# Patient Record
Sex: MALE | Race: OTHER | Employment: FULL TIME | URBAN - METROPOLITAN AREA
[De-identification: names, ages, dates, MRNs, and addresses within clinical notes are randomized per-mention and may not be internally consistent; named-entity substitution may affect disease eponyms.]

---

## 2021-03-07 ASSESSMENT — PAIN SCALES - GENERAL: PAINLEVEL_OUTOF10: 5

## 2021-03-07 ASSESSMENT — PAIN DESCRIPTION - LOCATION: LOCATION: ABDOMEN

## 2021-03-08 ENCOUNTER — ANESTHESIA (OUTPATIENT)
Dept: OPERATING ROOM | Age: 36
End: 2021-03-08

## 2021-03-08 ENCOUNTER — APPOINTMENT (OUTPATIENT)
Dept: NUCLEAR MEDICINE | Age: 36
End: 2021-03-08

## 2021-03-08 ENCOUNTER — HOSPITAL ENCOUNTER (OUTPATIENT)
Age: 36
Setting detail: OBSERVATION
Discharge: HOME | End: 2021-03-09
Attending: INTERNAL MEDICINE | Admitting: INTERNAL MEDICINE

## 2021-03-08 ENCOUNTER — APPOINTMENT (OUTPATIENT)
Dept: ULTRASOUND IMAGING | Age: 36
End: 2021-03-08

## 2021-03-08 ENCOUNTER — APPOINTMENT (OUTPATIENT)
Dept: GENERAL RADIOLOGY | Age: 36
End: 2021-03-08

## 2021-03-08 ENCOUNTER — APPOINTMENT (OUTPATIENT)
Dept: CT IMAGING | Age: 36
End: 2021-03-08

## 2021-03-08 ENCOUNTER — ANESTHESIA EVENT (OUTPATIENT)
Dept: OPERATING ROOM | Age: 36
End: 2021-03-08

## 2021-03-08 VITALS
DIASTOLIC BLOOD PRESSURE: 59 MMHG | RESPIRATION RATE: 18 BRPM | OXYGEN SATURATION: 100 % | SYSTOLIC BLOOD PRESSURE: 126 MMHG | TEMPERATURE: 97.5 F

## 2021-03-08 DIAGNOSIS — K81.0 ACUTE CHOLECYSTITIS: Primary | ICD-10-CM

## 2021-03-08 LAB
A/G RATIO: 1.6 (ref 1.1–2.2)
ALBUMIN SERPL-MCNC: 4.6 G/DL (ref 3.4–5)
ALP BLD-CCNC: 92 U/L (ref 40–129)
ALT SERPL-CCNC: 155 U/L (ref 10–40)
ANION GAP SERPL CALCULATED.3IONS-SCNC: 10 MMOL/L (ref 3–16)
AST SERPL-CCNC: 190 U/L (ref 15–37)
BASOPHILS ABSOLUTE: 0 K/UL (ref 0–0.2)
BASOPHILS RELATIVE PERCENT: 0.2 %
BILIRUB SERPL-MCNC: 2.5 MG/DL (ref 0–1)
BUN BLDV-MCNC: 15 MG/DL (ref 7–20)
CALCIUM SERPL-MCNC: 9.8 MG/DL (ref 8.3–10.6)
CHLORIDE BLD-SCNC: 102 MMOL/L (ref 99–110)
CO2: 24 MMOL/L (ref 21–32)
CREAT SERPL-MCNC: 1 MG/DL (ref 0.9–1.3)
EOSINOPHILS ABSOLUTE: 0.1 K/UL (ref 0–0.6)
EOSINOPHILS RELATIVE PERCENT: 1 %
ESTIMATED AVERAGE GLUCOSE: 108.3 MG/DL
GFR AFRICAN AMERICAN: >60
GFR NON-AFRICAN AMERICAN: >60
GLOBULIN: 2.9 G/DL
GLUCOSE BLD-MCNC: 182 MG/DL (ref 70–99)
HBA1C MFR BLD: 5.4 %
HCT VFR BLD CALC: 43.8 % (ref 40.5–52.5)
HEMOGLOBIN: 14.7 G/DL (ref 13.5–17.5)
LIPASE: 50 U/L (ref 13–60)
LYMPHOCYTES ABSOLUTE: 0.8 K/UL (ref 1–5.1)
LYMPHOCYTES RELATIVE PERCENT: 13.6 %
MCH RBC QN AUTO: 29.1 PG (ref 26–34)
MCHC RBC AUTO-ENTMCNC: 33.5 G/DL (ref 31–36)
MCV RBC AUTO: 86.8 FL (ref 80–100)
MONOCYTES ABSOLUTE: 0.4 K/UL (ref 0–1.3)
MONOCYTES RELATIVE PERCENT: 6.1 %
NEUTROPHILS ABSOLUTE: 4.7 K/UL (ref 1.7–7.7)
NEUTROPHILS RELATIVE PERCENT: 79.1 %
PDW BLD-RTO: 12.6 % (ref 12.4–15.4)
PLATELET # BLD: 108 K/UL (ref 135–450)
PMV BLD AUTO: 9.6 FL (ref 5–10.5)
POTASSIUM REFLEX MAGNESIUM: 4 MMOL/L (ref 3.5–5.1)
RBC # BLD: 5.05 M/UL (ref 4.2–5.9)
SODIUM BLD-SCNC: 136 MMOL/L (ref 136–145)
TOTAL PROTEIN: 7.5 G/DL (ref 6.4–8.2)
WBC # BLD: 5.9 K/UL (ref 4–11)

## 2021-03-08 PROCEDURE — 96366 THER/PROPH/DIAG IV INF ADDON: CPT

## 2021-03-08 PROCEDURE — 74177 CT ABD & PELVIS W/CONTRAST: CPT

## 2021-03-08 PROCEDURE — 76705 ECHO EXAM OF ABDOMEN: CPT

## 2021-03-08 PROCEDURE — 80053 COMPREHEN METABOLIC PANEL: CPT

## 2021-03-08 PROCEDURE — C1894 INTRO/SHEATH, NON-LASER: HCPCS | Performed by: SURGERY

## 2021-03-08 PROCEDURE — A9537 TC99M MEBROFENIN: HCPCS | Performed by: NURSE PRACTITIONER

## 2021-03-08 PROCEDURE — 83036 HEMOGLOBIN GLYCOSYLATED A1C: CPT

## 2021-03-08 PROCEDURE — 6360000002 HC RX W HCPCS: Performed by: ANESTHESIOLOGY

## 2021-03-08 PROCEDURE — 6360000002 HC RX W HCPCS: Performed by: NURSE ANESTHETIST, CERTIFIED REGISTERED

## 2021-03-08 PROCEDURE — 47563 LAPARO CHOLECYSTECTOMY/GRAPH: CPT | Performed by: SURGERY

## 2021-03-08 PROCEDURE — 7100000000 HC PACU RECOVERY - FIRST 15 MIN: Performed by: SURGERY

## 2021-03-08 PROCEDURE — 88304 TISSUE EXAM BY PATHOLOGIST: CPT

## 2021-03-08 PROCEDURE — 7100000001 HC PACU RECOVERY - ADDTL 15 MIN: Performed by: SURGERY

## 2021-03-08 PROCEDURE — 94761 N-INVAS EAR/PLS OXIMETRY MLT: CPT

## 2021-03-08 PROCEDURE — 6370000000 HC RX 637 (ALT 250 FOR IP): Performed by: SURGERY

## 2021-03-08 PROCEDURE — 74300 X-RAY BILE DUCTS/PANCREAS: CPT

## 2021-03-08 PROCEDURE — 2500000003 HC RX 250 WO HCPCS: Performed by: SURGERY

## 2021-03-08 PROCEDURE — 85025 COMPLETE CBC W/AUTO DIFF WBC: CPT

## 2021-03-08 PROCEDURE — 99283 EMERGENCY DEPT VISIT LOW MDM: CPT

## 2021-03-08 PROCEDURE — 2500000003 HC RX 250 WO HCPCS: Performed by: NURSE ANESTHETIST, CERTIFIED REGISTERED

## 2021-03-08 PROCEDURE — 2720000010 HC SURG SUPPLY STERILE: Performed by: SURGERY

## 2021-03-08 PROCEDURE — 99218 PR INITIAL OBSERVATION CARE/DAY 30 MINUTES: CPT | Performed by: SURGERY

## 2021-03-08 PROCEDURE — 78226 HEPATOBILIARY SYSTEM IMAGING: CPT

## 2021-03-08 PROCEDURE — 2580000003 HC RX 258: Performed by: INTERNAL MEDICINE

## 2021-03-08 PROCEDURE — 96374 THER/PROPH/DIAG INJ IV PUSH: CPT

## 2021-03-08 PROCEDURE — 3600000014 HC SURGERY LEVEL 4 ADDTL 15MIN: Performed by: SURGERY

## 2021-03-08 PROCEDURE — APPSS60 APP SPLIT SHARED TIME 46-60 MINUTES: Performed by: NURSE PRACTITIONER

## 2021-03-08 PROCEDURE — APPNB30 APP NON BILLABLE TIME 0-30 MINS: Performed by: NURSE PRACTITIONER

## 2021-03-08 PROCEDURE — C9113 INJ PANTOPRAZOLE SODIUM, VIA: HCPCS | Performed by: INTERNAL MEDICINE

## 2021-03-08 PROCEDURE — 83690 ASSAY OF LIPASE: CPT

## 2021-03-08 PROCEDURE — 6360000002 HC RX W HCPCS: Performed by: INTERNAL MEDICINE

## 2021-03-08 PROCEDURE — 96365 THER/PROPH/DIAG IV INF INIT: CPT

## 2021-03-08 PROCEDURE — 2700000000 HC OXYGEN THERAPY PER DAY

## 2021-03-08 PROCEDURE — 6370000000 HC RX 637 (ALT 250 FOR IP): Performed by: NURSE ANESTHETIST, CERTIFIED REGISTERED

## 2021-03-08 PROCEDURE — 3700000001 HC ADD 15 MINUTES (ANESTHESIA): Performed by: SURGERY

## 2021-03-08 PROCEDURE — 3430000000 HC RX DIAGNOSTIC RADIOPHARMACEUTICAL: Performed by: NURSE PRACTITIONER

## 2021-03-08 PROCEDURE — 2580000003 HC RX 258: Performed by: SURGERY

## 2021-03-08 PROCEDURE — 6360000004 HC RX CONTRAST MEDICATION: Performed by: PHYSICIAN ASSISTANT

## 2021-03-08 PROCEDURE — 2500000003 HC RX 250 WO HCPCS: Performed by: PHYSICIAN ASSISTANT

## 2021-03-08 PROCEDURE — 6360000004 HC RX CONTRAST MEDICATION: Performed by: SURGERY

## 2021-03-08 PROCEDURE — 96375 TX/PRO/DX INJ NEW DRUG ADDON: CPT

## 2021-03-08 PROCEDURE — 3700000000 HC ANESTHESIA ATTENDED CARE: Performed by: SURGERY

## 2021-03-08 PROCEDURE — G0378 HOSPITAL OBSERVATION PER HR: HCPCS

## 2021-03-08 PROCEDURE — 96361 HYDRATE IV INFUSION ADD-ON: CPT

## 2021-03-08 PROCEDURE — 3600000004 HC SURGERY LEVEL 4 BASE: Performed by: SURGERY

## 2021-03-08 PROCEDURE — 2580000003 HC RX 258: Performed by: NURSE PRACTITIONER

## 2021-03-08 PROCEDURE — 2580000003 HC RX 258: Performed by: PHYSICIAN ASSISTANT

## 2021-03-08 PROCEDURE — 2709999900 HC NON-CHARGEABLE SUPPLY: Performed by: SURGERY

## 2021-03-08 RX ORDER — DEXAMETHASONE SODIUM PHOSPHATE 4 MG/ML
INJECTION, SOLUTION INTRA-ARTICULAR; INTRALESIONAL; INTRAMUSCULAR; INTRAVENOUS; SOFT TISSUE PRN
Status: DISCONTINUED | OUTPATIENT
Start: 2021-03-08 | End: 2021-03-08 | Stop reason: SDUPTHER

## 2021-03-08 RX ORDER — ONDANSETRON 2 MG/ML
4 INJECTION INTRAMUSCULAR; INTRAVENOUS
Status: DISCONTINUED | OUTPATIENT
Start: 2021-03-08 | End: 2021-03-08 | Stop reason: HOSPADM

## 2021-03-08 RX ORDER — SUCCINYLCHOLINE/SOD CL,ISO/PF 200MG/10ML
SYRINGE (ML) INTRAVENOUS PRN
Status: DISCONTINUED | OUTPATIENT
Start: 2021-03-08 | End: 2021-03-08 | Stop reason: SDUPTHER

## 2021-03-08 RX ORDER — HYDROMORPHONE HYDROCHLORIDE 1 MG/ML
0.5 INJECTION, SOLUTION INTRAMUSCULAR; INTRAVENOUS; SUBCUTANEOUS
Status: DISCONTINUED | OUTPATIENT
Start: 2021-03-08 | End: 2021-03-08

## 2021-03-08 RX ORDER — POLYETHYLENE GLYCOL 3350 17 G/17G
17 POWDER, FOR SOLUTION ORAL DAILY PRN
Status: DISCONTINUED | OUTPATIENT
Start: 2021-03-08 | End: 2021-03-09 | Stop reason: HOSPADM

## 2021-03-08 RX ORDER — OXYCODONE HYDROCHLORIDE AND ACETAMINOPHEN 5; 325 MG/1; MG/1
2 TABLET ORAL EVERY 4 HOURS PRN
Status: DISCONTINUED | OUTPATIENT
Start: 2021-03-08 | End: 2021-03-09 | Stop reason: HOSPADM

## 2021-03-08 RX ORDER — SODIUM CHLORIDE, SODIUM LACTATE, POTASSIUM CHLORIDE, CALCIUM CHLORIDE 600; 310; 30; 20 MG/100ML; MG/100ML; MG/100ML; MG/100ML
INJECTION, SOLUTION INTRAVENOUS CONTINUOUS PRN
Status: COMPLETED | OUTPATIENT
Start: 2021-03-08 | End: 2021-03-08

## 2021-03-08 RX ORDER — LABETALOL HYDROCHLORIDE 5 MG/ML
5 INJECTION, SOLUTION INTRAVENOUS EVERY 10 MIN PRN
Status: DISCONTINUED | OUTPATIENT
Start: 2021-03-08 | End: 2021-03-08 | Stop reason: HOSPADM

## 2021-03-08 RX ORDER — PROMETHAZINE HYDROCHLORIDE 25 MG/1
12.5 TABLET ORAL EVERY 6 HOURS PRN
Status: DISCONTINUED | OUTPATIENT
Start: 2021-03-08 | End: 2021-03-09 | Stop reason: HOSPADM

## 2021-03-08 RX ORDER — BUPIVACAINE HYDROCHLORIDE 5 MG/ML
INJECTION, SOLUTION EPIDURAL; INTRACAUDAL
Status: COMPLETED | OUTPATIENT
Start: 2021-03-08 | End: 2021-03-08

## 2021-03-08 RX ORDER — PHENYLEPHRINE HCL IN 0.9% NACL 1 MG/10 ML
SYRINGE (ML) INTRAVENOUS PRN
Status: DISCONTINUED | OUTPATIENT
Start: 2021-03-08 | End: 2021-03-08 | Stop reason: SDUPTHER

## 2021-03-08 RX ORDER — OXYCODONE HYDROCHLORIDE AND ACETAMINOPHEN 5; 325 MG/1; MG/1
1 TABLET ORAL EVERY 4 HOURS PRN
Status: DISCONTINUED | OUTPATIENT
Start: 2021-03-08 | End: 2021-03-09 | Stop reason: HOSPADM

## 2021-03-08 RX ORDER — EPHEDRINE SULFATE/0.9% NACL/PF 50 MG/5 ML
SYRINGE (ML) INTRAVENOUS PRN
Status: DISCONTINUED | OUTPATIENT
Start: 2021-03-08 | End: 2021-03-08 | Stop reason: SDUPTHER

## 2021-03-08 RX ORDER — HYDRALAZINE HYDROCHLORIDE 20 MG/ML
5 INJECTION INTRAMUSCULAR; INTRAVENOUS EVERY 10 MIN PRN
Status: DISCONTINUED | OUTPATIENT
Start: 2021-03-08 | End: 2021-03-08 | Stop reason: HOSPADM

## 2021-03-08 RX ORDER — 0.9 % SODIUM CHLORIDE 0.9 %
1000 INTRAVENOUS SOLUTION INTRAVENOUS ONCE
Status: COMPLETED | OUTPATIENT
Start: 2021-03-08 | End: 2021-03-08

## 2021-03-08 RX ORDER — FENTANYL CITRATE 50 UG/ML
INJECTION, SOLUTION INTRAMUSCULAR; INTRAVENOUS PRN
Status: DISCONTINUED | OUTPATIENT
Start: 2021-03-08 | End: 2021-03-08 | Stop reason: SDUPTHER

## 2021-03-08 RX ORDER — HYDRALAZINE HYDROCHLORIDE 20 MG/ML
10 INJECTION INTRAMUSCULAR; INTRAVENOUS EVERY 6 HOURS PRN
Status: DISCONTINUED | OUTPATIENT
Start: 2021-03-08 | End: 2021-03-09 | Stop reason: HOSPADM

## 2021-03-08 RX ORDER — HYDROMORPHONE HCL 110MG/55ML
0.5 PATIENT CONTROLLED ANALGESIA SYRINGE INTRAVENOUS EVERY 5 MIN PRN
Status: DISCONTINUED | OUTPATIENT
Start: 2021-03-08 | End: 2021-03-08 | Stop reason: HOSPADM

## 2021-03-08 RX ORDER — ONDANSETRON 2 MG/ML
4 INJECTION INTRAMUSCULAR; INTRAVENOUS EVERY 6 HOURS PRN
Status: DISCONTINUED | OUTPATIENT
Start: 2021-03-08 | End: 2021-03-09 | Stop reason: HOSPADM

## 2021-03-08 RX ORDER — SODIUM CHLORIDE 0.9 % (FLUSH) 0.9 %
10 SYRINGE (ML) INJECTION PRN
Status: DISCONTINUED | OUTPATIENT
Start: 2021-03-08 | End: 2021-03-09 | Stop reason: HOSPADM

## 2021-03-08 RX ORDER — KETAMINE HCL IN NACL, ISO-OSM 100MG/10ML
SYRINGE (ML) INJECTION PRN
Status: DISCONTINUED | OUTPATIENT
Start: 2021-03-08 | End: 2021-03-08 | Stop reason: SDUPTHER

## 2021-03-08 RX ORDER — GLYCOPYRROLATE 1 MG/5 ML
SYRINGE (ML) INTRAVENOUS PRN
Status: DISCONTINUED | OUTPATIENT
Start: 2021-03-08 | End: 2021-03-08 | Stop reason: SDUPTHER

## 2021-03-08 RX ORDER — SODIUM CHLORIDE 0.9 % (FLUSH) 0.9 %
10 SYRINGE (ML) INJECTION EVERY 12 HOURS SCHEDULED
Status: DISCONTINUED | OUTPATIENT
Start: 2021-03-08 | End: 2021-03-09 | Stop reason: HOSPADM

## 2021-03-08 RX ORDER — MIDAZOLAM HYDROCHLORIDE 1 MG/ML
INJECTION INTRAMUSCULAR; INTRAVENOUS PRN
Status: DISCONTINUED | OUTPATIENT
Start: 2021-03-08 | End: 2021-03-08 | Stop reason: SDUPTHER

## 2021-03-08 RX ORDER — PROPOFOL 10 MG/ML
INJECTION, EMULSION INTRAVENOUS PRN
Status: DISCONTINUED | OUTPATIENT
Start: 2021-03-08 | End: 2021-03-08 | Stop reason: SDUPTHER

## 2021-03-08 RX ORDER — MAGNESIUM SULFATE HEPTAHYDRATE 500 MG/ML
INJECTION, SOLUTION INTRAMUSCULAR; INTRAVENOUS PRN
Status: DISCONTINUED | OUTPATIENT
Start: 2021-03-08 | End: 2021-03-08 | Stop reason: SDUPTHER

## 2021-03-08 RX ORDER — ROCURONIUM BROMIDE 10 MG/ML
INJECTION, SOLUTION INTRAVENOUS PRN
Status: DISCONTINUED | OUTPATIENT
Start: 2021-03-08 | End: 2021-03-08 | Stop reason: SDUPTHER

## 2021-03-08 RX ORDER — ACETAMINOPHEN 650 MG/1
650 SUPPOSITORY RECTAL EVERY 6 HOURS PRN
Status: DISCONTINUED | OUTPATIENT
Start: 2021-03-08 | End: 2021-03-09 | Stop reason: HOSPADM

## 2021-03-08 RX ORDER — ACETAMINOPHEN 325 MG/1
650 TABLET ORAL EVERY 6 HOURS PRN
Status: DISCONTINUED | OUTPATIENT
Start: 2021-03-08 | End: 2021-03-09 | Stop reason: HOSPADM

## 2021-03-08 RX ORDER — PANTOPRAZOLE SODIUM 40 MG/10ML
40 INJECTION, POWDER, LYOPHILIZED, FOR SOLUTION INTRAVENOUS DAILY
Status: DISCONTINUED | OUTPATIENT
Start: 2021-03-08 | End: 2021-03-09 | Stop reason: HOSPADM

## 2021-03-08 RX ORDER — MAGNESIUM HYDROXIDE 1200 MG/15ML
LIQUID ORAL CONTINUOUS PRN
Status: COMPLETED | OUTPATIENT
Start: 2021-03-08 | End: 2021-03-08

## 2021-03-08 RX ORDER — SODIUM CHLORIDE 9 MG/ML
INJECTION, SOLUTION INTRAVENOUS CONTINUOUS
Status: ACTIVE | OUTPATIENT
Start: 2021-03-08 | End: 2021-03-09

## 2021-03-08 RX ORDER — OXYCODONE HYDROCHLORIDE AND ACETAMINOPHEN 5; 325 MG/1; MG/1
1 TABLET ORAL
Status: DISCONTINUED | OUTPATIENT
Start: 2021-03-08 | End: 2021-03-08 | Stop reason: HOSPADM

## 2021-03-08 RX ORDER — MEPERIDINE HYDROCHLORIDE 25 MG/ML
12.5 INJECTION INTRAMUSCULAR; INTRAVENOUS; SUBCUTANEOUS EVERY 5 MIN PRN
Status: DISCONTINUED | OUTPATIENT
Start: 2021-03-08 | End: 2021-03-08 | Stop reason: HOSPADM

## 2021-03-08 RX ORDER — LIDOCAINE HYDROCHLORIDE 40 MG/ML
SOLUTION TOPICAL PRN
Status: DISCONTINUED | OUTPATIENT
Start: 2021-03-08 | End: 2021-03-08 | Stop reason: SDUPTHER

## 2021-03-08 RX ORDER — HYDROMORPHONE HCL 110MG/55ML
PATIENT CONTROLLED ANALGESIA SYRINGE INTRAVENOUS PRN
Status: DISCONTINUED | OUTPATIENT
Start: 2021-03-08 | End: 2021-03-08 | Stop reason: SDUPTHER

## 2021-03-08 RX ORDER — LIDOCAINE HYDROCHLORIDE 20 MG/ML
INJECTION, SOLUTION EPIDURAL; INFILTRATION; INTRACAUDAL; PERINEURAL PRN
Status: DISCONTINUED | OUTPATIENT
Start: 2021-03-08 | End: 2021-03-08 | Stop reason: SDUPTHER

## 2021-03-08 RX ADMIN — SODIUM CHLORIDE: 9 INJECTION, SOLUTION INTRAVENOUS at 17:31

## 2021-03-08 RX ADMIN — LIDOCAINE HYDROCHLORIDE 4 ML: 40 SOLUTION TOPICAL at 16:00

## 2021-03-08 RX ADMIN — Medication 0.2 MG: at 16:17

## 2021-03-08 RX ADMIN — LIDOCAINE HYDROCHLORIDE 100 MG: 20 INJECTION, SOLUTION EPIDURAL; INFILTRATION; INTRACAUDAL; PERINEURAL at 15:59

## 2021-03-08 RX ADMIN — HYDROMORPHONE HYDROCHLORIDE 0.5 MG: 2 INJECTION, SOLUTION INTRAMUSCULAR; INTRAVENOUS; SUBCUTANEOUS at 17:55

## 2021-03-08 RX ADMIN — SODIUM CHLORIDE: 9 INJECTION, SOLUTION INTRAVENOUS at 04:42

## 2021-03-08 RX ADMIN — PANTOPRAZOLE SODIUM 40 MG: 40 INJECTION, POWDER, FOR SOLUTION INTRAVENOUS at 04:42

## 2021-03-08 RX ADMIN — FENTANYL CITRATE 100 MCG: 50 INJECTION INTRAMUSCULAR; INTRAVENOUS at 15:59

## 2021-03-08 RX ADMIN — Medication 6.36 MILLICURIE: at 11:11

## 2021-03-08 RX ADMIN — IOPAMIDOL 75 ML: 755 INJECTION, SOLUTION INTRAVENOUS at 02:38

## 2021-03-08 RX ADMIN — MIDAZOLAM 2 MG: 1 INJECTION INTRAMUSCULAR; INTRAVENOUS at 15:53

## 2021-03-08 RX ADMIN — FAMOTIDINE 20 MG: 10 INJECTION, SOLUTION INTRAVENOUS at 01:33

## 2021-03-08 RX ADMIN — HYDROMORPHONE HYDROCHLORIDE 0.4 MG: 2 INJECTION, SOLUTION INTRAMUSCULAR; INTRAVENOUS; SUBCUTANEOUS at 17:18

## 2021-03-08 RX ADMIN — Medication 30 MG: at 16:10

## 2021-03-08 RX ADMIN — HYDROMORPHONE HYDROCHLORIDE 0.4 MG: 2 INJECTION, SOLUTION INTRAMUSCULAR; INTRAVENOUS; SUBCUTANEOUS at 17:12

## 2021-03-08 RX ADMIN — Medication 10 MG: at 16:22

## 2021-03-08 RX ADMIN — HYDROMORPHONE HYDROCHLORIDE 0.4 MG: 2 INJECTION, SOLUTION INTRAMUSCULAR; INTRAVENOUS; SUBCUTANEOUS at 17:15

## 2021-03-08 RX ADMIN — OXYCODONE HYDROCHLORIDE AND ACETAMINOPHEN 2 TABLET: 5; 325 TABLET ORAL at 20:49

## 2021-03-08 RX ADMIN — ROCURONIUM BROMIDE 40 MG: 10 INJECTION, SOLUTION INTRAVENOUS at 16:06

## 2021-03-08 RX ADMIN — HYDROMORPHONE HYDROCHLORIDE 0.4 MG: 2 INJECTION, SOLUTION INTRAMUSCULAR; INTRAVENOUS; SUBCUTANEOUS at 17:09

## 2021-03-08 RX ADMIN — PROPOFOL 100 MG: 10 INJECTION, EMULSION INTRAVENOUS at 17:05

## 2021-03-08 RX ADMIN — Medication 200 MG: at 15:59

## 2021-03-08 RX ADMIN — Medication 100 MCG: at 16:28

## 2021-03-08 RX ADMIN — SUGAMMADEX 200 MG: 100 INJECTION, SOLUTION INTRAVENOUS at 17:13

## 2021-03-08 RX ADMIN — HYDROMORPHONE HYDROCHLORIDE 0.5 MG: 2 INJECTION, SOLUTION INTRAMUSCULAR; INTRAVENOUS; SUBCUTANEOUS at 18:11

## 2021-03-08 RX ADMIN — PIPERACILLIN AND TAZOBACTAM 3375 MG: 3; .375 INJECTION, POWDER, LYOPHILIZED, FOR SOLUTION INTRAVENOUS at 04:43

## 2021-03-08 RX ADMIN — HYDROMORPHONE HYDROCHLORIDE 0.4 MG: 2 INJECTION, SOLUTION INTRAMUSCULAR; INTRAVENOUS; SUBCUTANEOUS at 17:16

## 2021-03-08 RX ADMIN — PIPERACILLIN AND TAZOBACTAM 3375 MG: 3; .375 INJECTION, POWDER, LYOPHILIZED, FOR SOLUTION INTRAVENOUS at 13:21

## 2021-03-08 RX ADMIN — MAGNESIUM SULFATE HEPTAHYDRATE 1 G: 500 INJECTION, SOLUTION INTRAMUSCULAR; INTRAVENOUS at 16:14

## 2021-03-08 RX ADMIN — PROPOFOL 300 MG: 10 INJECTION, EMULSION INTRAVENOUS at 15:59

## 2021-03-08 RX ADMIN — Medication 10 ML: at 11:11

## 2021-03-08 RX ADMIN — DEXAMETHASONE SODIUM PHOSPHATE 8 MG: 4 INJECTION, SOLUTION INTRAMUSCULAR; INTRAVENOUS at 16:10

## 2021-03-08 RX ADMIN — SODIUM CHLORIDE: 9 INJECTION, SOLUTION INTRAVENOUS at 16:11

## 2021-03-08 RX ADMIN — SODIUM CHLORIDE 1000 ML: 9 INJECTION, SOLUTION INTRAVENOUS at 01:53

## 2021-03-08 RX ADMIN — DEXAMETHASONE SODIUM PHOSPHATE 4 MG: 4 INJECTION, SOLUTION INTRAMUSCULAR; INTRAVENOUS at 17:24

## 2021-03-08 RX ADMIN — ROCURONIUM BROMIDE 10 MG: 10 INJECTION, SOLUTION INTRAVENOUS at 16:40

## 2021-03-08 ASSESSMENT — PULMONARY FUNCTION TESTS
PIF_VALUE: 2
PIF_VALUE: 27
PIF_VALUE: 26
PIF_VALUE: 25
PIF_VALUE: 25
PIF_VALUE: 5
PIF_VALUE: 19
PIF_VALUE: 25
PIF_VALUE: 0
PIF_VALUE: 27
PIF_VALUE: 2
PIF_VALUE: 26
PIF_VALUE: 4
PIF_VALUE: 17
PIF_VALUE: 24
PIF_VALUE: 25
PIF_VALUE: 25
PIF_VALUE: 26
PIF_VALUE: 4
PIF_VALUE: 26
PIF_VALUE: 26
PIF_VALUE: 5
PIF_VALUE: 26
PIF_VALUE: 22
PIF_VALUE: 27
PIF_VALUE: 28
PIF_VALUE: 27
PIF_VALUE: 2
PIF_VALUE: 26
PIF_VALUE: 24
PIF_VALUE: 28
PIF_VALUE: 24
PIF_VALUE: 26
PIF_VALUE: 25
PIF_VALUE: 26
PIF_VALUE: 2
PIF_VALUE: 25
PIF_VALUE: 25
PIF_VALUE: 22
PIF_VALUE: 21
PIF_VALUE: 25
PIF_VALUE: 26
PIF_VALUE: 25
PIF_VALUE: 25
PIF_VALUE: 4
PIF_VALUE: 19
PIF_VALUE: 25
PIF_VALUE: 5
PIF_VALUE: 20
PIF_VALUE: 25
PIF_VALUE: 25
PIF_VALUE: 6
PIF_VALUE: 25
PIF_VALUE: 0
PIF_VALUE: 26
PIF_VALUE: 20

## 2021-03-08 ASSESSMENT — PAIN SCALES - GENERAL: PAINLEVEL_OUTOF10: 7

## 2021-03-08 ASSESSMENT — ENCOUNTER SYMPTOMS
CHEST TIGHTNESS: 0
APNEA: 0
EYE DISCHARGE: 0
COLOR CHANGE: 0
SHORTNESS OF BREATH: 0
VOMITING: 1
EYE ITCHING: 0
NAUSEA: 1
BACK PAIN: 0
ABDOMINAL PAIN: 1
ABDOMINAL DISTENTION: 0

## 2021-03-08 ASSESSMENT — PAIN DESCRIPTION - PAIN TYPE
TYPE: SURGICAL PAIN
TYPE: SURGICAL PAIN

## 2021-03-08 ASSESSMENT — PAIN DESCRIPTION - ORIENTATION
ORIENTATION: RIGHT;UPPER
ORIENTATION: RIGHT;UPPER

## 2021-03-08 ASSESSMENT — PAIN DESCRIPTION - LOCATION: LOCATION: ABDOMEN

## 2021-03-08 ASSESSMENT — LIFESTYLE VARIABLES: SMOKING_STATUS: 0

## 2021-03-08 NOTE — ANESTHESIA POSTPROCEDURE EVALUATION
Department of Anesthesiology  Postprocedure Note    Patient: Nathaniel Moss  MRN: [id number]  YOB: 1985  Date of evaluation: 3/8/2021  Time:  6:08 PM     Procedure Summary     Date: 03/08/21 Room / Location: [address]    Anesthesia Start: 9824 Anesthesia Stop: 2133    Procedure: LAPAROSCOPIC CHOLECYSTECTOMY WITH INTRAOPERATIVE CHOLANGIOGRAM (N/A ) Diagnosis: (CHOLECYSTITIS)    Surgeons: Tia Valdes MD Responsible Provider: Jagdish Oviedo MD    Anesthesia Type: general ASA Status: 2          Anesthesia Type: general    Curt Phase I: Curt Score: 8    Curt Phase II:      Last vitals: Reviewed and per EMR flowsheets.        Anesthesia Post Evaluation    Patient location during evaluation: PACU  Patient participation: complete - patient participated  Level of consciousness: awake and alert  Airway patency: patent  Nausea & Vomiting: no vomiting and no nausea  Complications: no  Cardiovascular status: hemodynamically stable  Respiratory status: acceptable  Hydration status: stable

## 2021-03-08 NOTE — PLAN OF CARE
[facility]        Assessment & Plan of Care    History of Present Illness: Mr. Sushila Ayon is a 28 y.o. male who presents with a 1-day history of intermittent, sharp, pressure-like epigastric abdominal pain that initially woke him from sleep around 03:00 yesterday morning, eased up but then returned again around 15:00 and was constant, radiating into the back, and rated at a 7 out of 10 prompting him to come to the ED for further evaluation. No alleviating factors noted--tired OTC antiacids without relief; worse with cold water PO intake. Associated nausea, vomiting, and constipation. Last BM yesterday. He denies fevers, chills, anorexia, diarrhea, hematochezia, melena, urinary symptoms, bloating, chest pain, cough, SOB, or exposure to COVID-19. He reports similar symptoms a couple of weeks ago for which he was seen at [facility] ED and sent home, told that symptoms were likely related to reflux. These episodes of pain seem to occur several hours after intake of fatty foods. Medical history of kidney stones and GERD--he intermittently takes OTC antiacids for symptoms, no prior EGD. Denies heavy NSAID use. No prior abdominal surgeries. No blood thinners. Former smoker, quit on 1/4/2021. At present denies any abdominal pain or nausea.     Physical Exam:  CONSTITUTIONAL:  alert, no apparent distress and morbidly obese  NEUROLOGIC:  Mental Status Exam:  Level of Alertness:   awake  Orientation:   person, place, time  EYES:  sclera clear  ENT:  normocepalic, without obvious abnormality  NECK:  supple, symmetrical, trachea midline  LUNGS:  clear to auscultation  CARDIOVASCULAR:  regular rate and rhythm and no murmur noted  ABDOMEN: soft, non-distended, non-tender, voluntary guarding absent, no masses palpated, normal bowel sounds,  no scars, and hernia absent  Extremities: no edema  SKIN:  no bruising or bleeding and normal skin color, texture,

## 2021-03-08 NOTE — PROGRESS NOTES
Shift assessment completed. VSS. Patient alert and oriented x 4. Denies any pain or discomfort at this time. The care plan and education have been reviewed and mutually agreed upon with the patient. Call light in reach. Will continue to monitor. 1331 Consent obtained for laparoscopic cholecystectomy with intraoperative cholangiogram.     1527 Patient taken down to surgery.

## 2021-03-08 NOTE — H&P
Joslyn Lewis     Chief complaint-right upper quadrant abdominal pain    HPI: 68-year-old male who presented to the emergency room with a 1 day history of intermittent, sharp epigastric abdominal pain which woke him from sleep at 3 AM yesterday morning. The pain waxed and waned throughout the day but worsened significantly prompting his evaluation in the emergency room overnight. The pain is rated as a 7 out of 10 severity. Nothing makes it better or worse. Associated symptoms include nausea, vomiting constipation. No history of fevers, chills or urinary symptoms. Past Medical History:   Diagnosis Date    GERD (gastroesophageal reflux disease)        History reviewed. No pertinent surgical history.     Social History     Socioeconomic History    Marital status: Single     Spouse name: Not on file    Number of children: Not on file    Years of education: Not on file    Highest education level: Not on file   Occupational History    Not on file   Social Needs    Financial resource strain: Not on file    Food insecurity     Worry: Not on file     Inability: Not on file    Transportation needs     Medical: Not on file     Non-medical: Not on file   Tobacco Use    Smoking status: Former Smoker    Smokeless tobacco: Never Used   Substance and Sexual Activity    Alcohol use: Not on file    Drug use: Not on file    Sexual activity: Not on file   Lifestyle    Physical activity     Days per week: Not on file     Minutes per session: Not on file    Stress: Not on file   Relationships    Social connections     Talks on phone: Not on file     Gets together: Not on file     Attends Latter-day service: Not on file     Active member of club or organization: Not on file     Attends meetings of clubs or organizations: Not on file     Relationship status: Not on file    Intimate partner violence     Fear of current or ex partner: Not on file     Emotionally abused: Not on file     Physically abused: Not on file Forced sexual activity: Not on file   Other Topics Concern    Not on file   Social History Narrative    Not on file       Allergies: No Known Allergies    Prior to Admission medications    Not on File       Active Problems:    Acute cholecystitis  Resolved Problems:    * No resolved hospital problems. *      Blood pressure 131/83, pulse 67, temperature 97.8 °F (36.6 °C), temperature source Oral, resp. rate 18, height 6' 1\" (1.854 m), weight (!) 350 lb (158.8 kg), SpO2 96 %. Review of Systems   Constitutional: Positive for activity change and appetite change. Negative for chills and fever. HENT: Negative for congestion and dental problem. Eyes: Negative for discharge and itching. Respiratory: Negative for apnea and chest tightness. Cardiovascular: Negative for chest pain and leg swelling. Gastrointestinal: Positive for abdominal pain, nausea and vomiting. Negative for abdominal distention. Endocrine: Negative for cold intolerance and heat intolerance. Genitourinary: Negative for difficulty urinating and dysuria. Musculoskeletal: Negative for arthralgias and back pain. Skin: Negative for color change and pallor. Allergic/Immunologic: Negative for environmental allergies and food allergies. Neurological: Negative for dizziness and facial asymmetry. Hematological: Negative for adenopathy. Does not bruise/bleed easily. Psychiatric/Behavioral: Negative for agitation and behavioral problems. Physical Exam  Constitutional:       Appearance: He is well-developed. HENT:      Head: Normocephalic and atraumatic. Right Ear: External ear normal.      Left Ear: External ear normal.   Eyes:      Conjunctiva/sclera: Conjunctivae normal.   Neck:      Musculoskeletal: Normal range of motion and neck supple. Cardiovascular:      Rate and Rhythm: Normal rate and regular rhythm. Pulmonary:      Effort: Pulmonary effort is normal.      Breath sounds: Normal breath sounds.    Abdominal: General: There is no distension. Palpations: Abdomen is soft. Tenderness: There is abdominal tenderness. Musculoskeletal: Normal range of motion. Skin:     General: Skin is warm and dry. Neurological:      Mental Status: He is alert and oriented to person, place, and time. Psychiatric:         Behavior: Behavior normal.         Assessment:  55-year-old male with a 1 day history of sharp right upper quadrant abdominal pain associated with nausea, vomiting and constipation. Physical examination reveals focal right upper quadrant abdominal tenderness. White blood count is normal.  He has abnormal liver function tests including an ALT of 155, AST of 190 and a total bilirubin of 2.5. CAT scan of the abdomen pelvis showed findings suggestive of acute cholecystitis. Right upper quadrant ultrasound showed gallbladder wall thickening without gallstones. A subsequent HIDA scan showed nonvisualization of the gallbladder. The patient's clinical course and imaging studies are consistent with acute acalculous cholecystitis. Plan:  IV antibiotics  Laparoscopic cholecystectomy with intraoperative cholangiogram  Patient explained the risks,benefits and possible complications including bleeding, bowel injury, cbd injury or hepatic artery injury.             Shad Espinosa MD  3/8/2021

## 2021-03-08 NOTE — PLAN OF CARE
Problem: Pain:  Goal: Pain level will decrease  Description: Pain level will decrease  3/8/2021 1118 by Delma Cr RN  Outcome: Ongoing  3/8/2021 0819 by Lex Shrestha RN  Outcome: Ongoing  Goal: Control of acute pain  Description: Control of acute pain  3/8/2021 1118 by Delma Cr RN  Outcome: Ongoing  3/8/2021 0819 by Lex Shrestha RN  Outcome: Ongoing

## 2021-03-08 NOTE — ED NOTES
Report given on tablet, RN verbalized understanding and denied any need for further information, patient to be transported to unit at this time, normal saline infusing at time of transport      Brandon Garza RN  03/08/21 8640

## 2021-03-08 NOTE — Clinical Note
Patient Class: Inpatient [101]   REQUIRED: Diagnosis: Acute cholecystitis [575. 0. ICD-9-CM]   Estimated Length of Stay: Estimated stay of more than 2 midnights   Telemetry Bed Required?: No

## 2021-03-08 NOTE — ED PROVIDER NOTES
[facility]        Pt Name: Irineo Ruff  MRN: [id number]  [place] 1985  Date of evaluation: 3/7/2021  Provider: Chung Gerber  PCP: No primary care provider on file. LOY. I have evaluated this patient. My supervising physician was available for consultation. CHIEF COMPLAINT       Chief Complaint   Patient presents with    Abdominal Pain     c/o mid upper abd pain, no abd surgery hx. pain comes and goes with rad up back. c/o emesis and not able to urinate. HISTORY OF PRESENT ILLNESS   (Location, Timing/Onset, Context/Setting, Quality, Duration, Modifying Factors, Severity, Associated Signs and Symptoms)  Note limiting factors. Irineo Ruff is a 28 y.o. male patient presents emergency department for evaluation of mid upper abdominal pain. Patient states he was seen at [facility] for similar symptoms and had normal laboratory evaluation at that time. He states that they sent him home with Pepcid. Patient states he has acute worsening discomfort of epigastric abdominal pain that occasionally radiates into his back. He states that radiates slightly into his chest.  Denies any chest pain or shortness of breath. Patient states he has associated episodes of vomiting with the abdominal pain. He states that vomiting helps his pain lessened slightly for a few moments. He states he has had 3 vomiting episodes today. He states his pain started around 3 PM.  He states he had pizza for lunch. He states that his pain has been triggered by pizza in the past.  He states he feels as though food makes it worse but has been able to eat things like fast food without difficulty. Patient states he did not eat dinner today due to the discomfort. Patient states that at this time his pain has suddenly completely resolved. He states he has 0 pain at this time.   Patient states this is exactly how it happened the last time he components:    Glucose 182 (*)     Total Bilirubin 2.5 (*)      (*)      (*)     All other components within normal limits    Narrative:     Performed at:  [facility]  [address] [facility],  [place], [address]   Phone [phone]   LIPASE    Narrative:     Performed at:  [facility]  [address] [facility],  [place], [address]   Phone [phone]   HEMOGLOBIN A1C       All other labs were within normal range or not returned as of this dictation. EKG: All EKG's are interpreted by the Emergency Department Physician in the absence of a cardiologist.  Please see their note for interpretation of EKG. RADIOLOGY:   Non-plain film images such as CT, Ultrasound and MRI are read by the radiologist. Plain radiographic images are visualized and preliminarily interpreted by the ED Provider with the below findings:        Interpretation per the Radiologist below, if available at the time of this note:    CT ABDOMEN PELVIS W IV CONTRAST Additional Contrast? None   Final Result   Findings compatible with acute cholecystitis. Ultrasound may be obtained to   evaluate for underlying cholelithiasis. Normal appendix. Splenomegaly. US ABDOMEN LIMITED Specify organ? GALLBLADDER, LIVER    (Results Pending)     No results found.         PROCEDURES   Unless otherwise noted below, none     Procedures    CRITICAL CARE TIME   N/A    CONSULTS:  IP CONSULT TO GENERAL SURGERY      EMERGENCY DEPARTMENT COURSE and DIFFERENTIAL DIAGNOSIS/MDM:   Vitals:    Vitals:    03/07/21 2339   BP: (!) 155/92   Pulse: 69   Resp: 16   Temp: 98.2 °F (36.8 °C)   TempSrc: Infrared   SpO2: 96%   Weight: (!) 350 lb (158.8 kg)   Height: 6' 1\" (1.854 m)       Patient was given the following medications:  Medications   piperacillin-tazobactam (ZOSYN) 3,375 mg in dextrose 5 % 50 mL IVPB extended infusion (mini-bag) (has no administration in time range)   HYDROmorphone HCl PF (DILAUDID) injection 0.5 mg (has no administration in time range)   0.9 % sodium chloride infusion (has no administration in time range)   pantoprazole (PROTONIX) injection 40 mg (has no administration in time range)   hydrALAZINE (APRESOLINE) injection 10 mg (has no administration in time range)   0.9 % sodium chloride bolus (0 mLs Intravenous Stopped 3/8/21 0314)   famotidine (PEPCID) injection 20 mg (20 mg Intravenous Given 3/8/21 0133)   iopamidol (ISOVUE-370) 76 % injection 75 mL (75 mLs Intravenous Given 3/8/21 0238)         Patient presents emergency department for evaluation of abdominal pain. Patient states he has epigastric abdominal pain that lasts for a few hours and then will suddenly go away on its own. Patient states he had pain today for 9 hours and it just went away right before I came to evaluate the patient. Abdomen is soft and nontender without rebound or guarding. Patient abdomen is obese. He has a BMI of 46. Patient is given Pepcid but declines GI cocktail as his symptoms have largely resolved. Patient is afebrile. Vitals stable. Patient's bilirubin is 2.5, ALT is 155 and AST is 190. Patient is given a liter of fluids. Glucose is 182. Patient declines need for pain medication at this time. CT of the patient's abdomen shows acute cholecystitis. Ultrasound recommended to correlate for evidence of stones. I spoke with Dr. Charan Gutierrez who states that he will see the patient. Patient will be admitted to the hospitalist for further management. Dr. Maria Ines Serrano accepts the patient for admission. FINAL IMPRESSION      1. Acute cholecystitis          DISPOSITION/PLAN   DISPOSITION Admitted 03/08/2021 04:36:54 AM      PATIENT REFERREDTO:  No follow-up provider specified.     DISCHARGE MEDICATIONS:  New Prescriptions    No medications on file       DISCONTINUED MEDICATIONS:  Discontinued Medications    No medications on file              (Please note that portions of this note were completed with a voice recognition program.  Efforts were made to edit the dictations but occasionally words are mis-transcribed.)    Raquel Jones PA-C (electronically signed)            Raquel Jones PA-C  03/08/21 4423

## 2021-03-08 NOTE — LETTER
[facility]  [address]  Phone: [phone]    No name on file. March 9, 2021     Patient: Chris Contreras   YOB: 1985   Date of Visit: 3/7/2021       To Whom It May Concern:    Please excuse Mr Placido Penny from work from 3/8/2021 thru 3/15/21. He  may return to work on 3/15/2021. If you have any questions or concerns, please don't hesitate to call.     Sincerely,      Jackie Bojorquez PA-C

## 2021-03-08 NOTE — ANESTHESIA PRE PROCEDURE
Department of Anesthesiology  Preprocedure Note       Name:  Ortega Roque   Age:  28 y.o.  :  1985                                          MRN:  [id number]         Date:  3/8/2021      Surgeon: Oscar Collier):  Stuart Carney MD    Procedure: Procedure(s):  LAPAROSCOPIC CHOLECYSTECTOMY WITH INTRAOPERATIVE CHOLANGIOGRAM    Medications prior to admission:   Prior to Admission medications    Not on File       Current medications:    Current Facility-Administered Medications   Medication Dose Route Frequency Provider Last Rate Last Admin    piperacillin-tazobactam (ZOSYN) 3,375 mg in dextrose 5 % 50 mL IVPB extended infusion (mini-bag)  3,375 mg Intravenous Q8H Oxana Ellis MD 12.5 mL/hr at 21 1321 3,375 mg at 21 1321    HYDROmorphone HCl PF (DILAUDID) injection 0.5 mg  0.5 mg Intravenous Q3H PRN Oxana Ellis MD        0.9 % sodium chloride infusion   Intravenous Continuous Oxana Ellis  mL/hr at 21 0442 New Bag at 21 0442    pantoprazole (PROTONIX) injection 40 mg  40 mg Intravenous Daily Oxana Ellis MD   40 mg at 21 0442    sodium chloride flush 0.9 % injection 10 mL  10 mL Intravenous 2 times per day Oxana Ellis MD        sodium chloride flush 0.9 % injection 10 mL  10 mL Intravenous PRN Oxana Ellis MD        enoxaparin (LOVENOX) injection 40 mg  40 mg Subcutaneous Nightly Oxana Ellis MD        promethazine (PHENERGAN) tablet 12.5 mg  12.5 mg Oral Q6H PRN Oxana Ellis MD        Or    ondansetron (ZOFRAN) injection 4 mg  4 mg Intravenous Q6H PRN Oxana Ellis MD        polyethylene glycol (GLYCOLAX) packet 17 g  17 g Oral Daily PRN Oxana Ellis MD        acetaminophen (TYLENOL) tablet 650 mg  650 mg Oral Q6H PRN Oxana Ellis MD        Or    acetaminophen (TYLENOL) suppository 650 mg  650 mg Rectal Q6H PRN Oxana Ellis MD        hydrALAZINE (APRESOLINE) injection 10 mg  10 mg Intravenous Q6H PRN Madina Rios MD        sodium chloride flush 0.9 % injection 10 mL  10 mL Intravenous PRN Cindi Vaca APRGLENNY - CNP   10 mL at 03/08/21 1111    meperidine (DEMEROL) injection 12.5 mg  12.5 mg Intravenous Q5 Min PRN Lestine Leyden, MD        HYDROmorphone (DILAUDID) injection 0.5 mg  0.5 mg Intravenous Q5 Min PRN Lestine Leyden, MD        oxyCODONE-acetaminophen (PERCOCET) 5-325 MG per tablet 1 tablet  1 tablet Oral Once PRN Lestine Leyden, MD        ondansetron [facility]) injection 4 mg  4 mg Intravenous Once PRN Lestine Leyden, MD        labetalol (NORMODYNE;TRANDATE) injection 5 mg  5 mg Intravenous Q10 Min PRN Lestine Leyden, MD        hydrALAZINE (APRESOLINE) injection 5 mg  5 mg Intravenous Q10 Min PRN Lestine Leyden, MD           Allergies:  No Known Allergies    Problem List:    Patient Active Problem List   Diagnosis Code    Acute cholecystitis K81.0       Past Medical History:        Diagnosis Date    GERD (gastroesophageal reflux disease)        Past Surgical History:  History reviewed. No pertinent surgical history.     Social History:    Social History     Tobacco Use    Smoking status: Former Smoker    Smokeless tobacco: Never Used   Substance Use Topics    Alcohol use: Not on file                                Counseling given: Not Answered      Vital Signs (Current):   Vitals:    03/07/21 2339 03/08/21 0545 03/08/21 1045 03/08/21 1515   BP: (!) 155/92 107/70 114/67 131/83   Pulse: 69 61 68 67   Resp: 16 17 18    Temp: 98.2 °F (36.8 °C) 97.6 °F (36.4 °C) 97.7 °F (36.5 °C) 97.8 °F (36.6 °C)   TempSrc: Infrared Oral Oral Oral   SpO2: 96% 97% 96% 96%   Weight: (!) 350 lb (158.8 kg) (!) 350 lb (158.8 kg)     Height: 6' 1\" (1.854 m) 6' 1\" (1.854 m)                                                BP Readings from Last 3 Encounters:   03/08/21 131/83       NPO Status: Time of last liquid consumption: 2200                        Time of last solid consumption: 0900                        Date of last liquid consumption: 03/07/21                        Date of last solid food consumption: 03/07/21    BMI:   Wt Readings from Last 3 Encounters:   03/08/21 (!) 350 lb (158.8 kg)     Body mass index is 46.18 kg/m². CBC:   Lab Results   Component Value Date    WBC 5.9 03/08/2021    RBC 5.05 03/08/2021    HGB 14.7 03/08/2021    HCT 43.8 03/08/2021    MCV 86.8 03/08/2021    RDW 12.6 03/08/2021     03/08/2021       CMP:   Lab Results   Component Value Date     03/08/2021    K 4.0 03/08/2021     03/08/2021    CO2 24 03/08/2021    BUN 15 03/08/2021    CREATININE 1.0 03/08/2021    GFRAA >60 03/08/2021    AGRATIO 1.6 03/08/2021    LABGLOM >60 03/08/2021    GLUCOSE 182 03/08/2021    PROT 7.5 03/08/2021    CALCIUM 9.8 03/08/2021    BILITOT 2.5 03/08/2021    ALKPHOS 92 03/08/2021     03/08/2021     03/08/2021       POC Tests: No results for input(s): POCGLU, POCNA, POCK, POCCL, POCBUN, POCHEMO, POCHCT in the last 72 hours.     Coags: No results found for: PROTIME, INR, APTT    HCG (If Applicable): No results found for: PREGTESTUR, PREGSERUM, HCG, HCGQUANT     ABGs: No results found for: PHART, PO2ART, DJO2AME, FAW9ZHH, BEART, [id number]     Type & Screen (If Applicable):  No results found for: LABABO, LABRH    Drug/Infectious Status (If Applicable):  No results found for: HIV, HEPCAB    COVID-19 Screening (If Applicable): No results found for: COVID19      Anesthesia Evaluation  Patient summary reviewed and Nursing notes reviewed no history of anesthetic complications:   Airway: Mallampati: II  TM distance: >3 FB   Neck ROM: full  Mouth opening: > = 3 FB Dental: normal exam         Pulmonary:Negative Pulmonary ROS and normal exam  breath sounds clear to auscultation      (-) COPD, asthma, sleep apnea and not a current smoker                           Cardiovascular:        (-) hypertension, past MI, CAD, CABG/stent, dysrhythmias,  angina and  CHF Rhythm: regular  Rate: normal                    Neuro/Psych:   Negative Neuro/Psych ROS     (-) seizures, TIA and CVA           GI/Hepatic/Renal:   (+) GERD (fair control, no symptoms currently):,      (-) liver disease and no renal disease       Endo/Other: Negative Endo/Other ROS       (-) diabetes mellitus, hypothyroidism, hyperthyroidism               Abdominal:   (+) obese,         Vascular:                                        Anesthesia Plan      general     ASA 2       Induction: intravenous. MIPS: Postoperative opioids intended and Prophylactic antiemetics administered. Anesthetic plan and risks discussed with patient. Plan discussed with CRNA.                   Prakash Maya MD   3/8/2021

## 2021-03-09 VITALS
RESPIRATION RATE: 16 BRPM | SYSTOLIC BLOOD PRESSURE: 126 MMHG | HEART RATE: 62 BPM | HEIGHT: 73 IN | TEMPERATURE: 97.8 F | WEIGHT: 315 LBS | BODY MASS INDEX: 41.75 KG/M2 | OXYGEN SATURATION: 96 % | DIASTOLIC BLOOD PRESSURE: 67 MMHG

## 2021-03-09 LAB
A/G RATIO: 1.3 (ref 1.1–2.2)
ALBUMIN SERPL-MCNC: 4.4 G/DL (ref 3.4–5)
ALP BLD-CCNC: 116 U/L (ref 40–129)
ALT SERPL-CCNC: 372 U/L (ref 10–40)
ANION GAP SERPL CALCULATED.3IONS-SCNC: 10 MMOL/L (ref 3–16)
AST SERPL-CCNC: 314 U/L (ref 15–37)
BASOPHILS ABSOLUTE: 0 K/UL (ref 0–0.2)
BASOPHILS RELATIVE PERCENT: 0.1 %
BILIRUB SERPL-MCNC: 3.4 MG/DL (ref 0–1)
BUN BLDV-MCNC: 12 MG/DL (ref 7–20)
CALCIUM SERPL-MCNC: 10 MG/DL (ref 8.3–10.6)
CHLORIDE BLD-SCNC: 103 MMOL/L (ref 99–110)
CO2: 24 MMOL/L (ref 21–32)
CREAT SERPL-MCNC: 1.1 MG/DL (ref 0.9–1.3)
EOSINOPHILS ABSOLUTE: 0 K/UL (ref 0–0.6)
EOSINOPHILS RELATIVE PERCENT: 0 %
GFR AFRICAN AMERICAN: >60
GFR NON-AFRICAN AMERICAN: >60
GLOBULIN: 3.4 G/DL
GLUCOSE BLD-MCNC: 135 MG/DL (ref 70–99)
HCT VFR BLD CALC: 44.2 % (ref 40.5–52.5)
HEMOGLOBIN: 14.7 G/DL (ref 13.5–17.5)
LYMPHOCYTES ABSOLUTE: 0.8 K/UL (ref 1–5.1)
LYMPHOCYTES RELATIVE PERCENT: 12.9 %
MCH RBC QN AUTO: 29.3 PG (ref 26–34)
MCHC RBC AUTO-ENTMCNC: 33.2 G/DL (ref 31–36)
MCV RBC AUTO: 88.1 FL (ref 80–100)
MONOCYTES ABSOLUTE: 0.4 K/UL (ref 0–1.3)
MONOCYTES RELATIVE PERCENT: 6.3 %
NEUTROPHILS ABSOLUTE: 4.9 K/UL (ref 1.7–7.7)
NEUTROPHILS RELATIVE PERCENT: 80.7 %
PDW BLD-RTO: 13.2 % (ref 12.4–15.4)
PLATELET # BLD: 116 K/UL (ref 135–450)
PMV BLD AUTO: 9.7 FL (ref 5–10.5)
POTASSIUM REFLEX MAGNESIUM: 4.4 MMOL/L (ref 3.5–5.1)
RBC # BLD: 5.01 M/UL (ref 4.2–5.9)
SODIUM BLD-SCNC: 137 MMOL/L (ref 136–145)
TOTAL PROTEIN: 7.8 G/DL (ref 6.4–8.2)
WBC # BLD: 6.1 K/UL (ref 4–11)

## 2021-03-09 PROCEDURE — 6370000000 HC RX 637 (ALT 250 FOR IP): Performed by: SURGERY

## 2021-03-09 PROCEDURE — 6360000002 HC RX W HCPCS: Performed by: SURGERY

## 2021-03-09 PROCEDURE — APPNB30 APP NON BILLABLE TIME 0-30 MINS: Performed by: NURSE PRACTITIONER

## 2021-03-09 PROCEDURE — APPSS15 APP SPLIT SHARED TIME 0-15 MINUTES: Performed by: NURSE PRACTITIONER

## 2021-03-09 PROCEDURE — 99024 POSTOP FOLLOW-UP VISIT: CPT | Performed by: SURGERY

## 2021-03-09 PROCEDURE — 85025 COMPLETE CBC W/AUTO DIFF WBC: CPT

## 2021-03-09 PROCEDURE — 80053 COMPREHEN METABOLIC PANEL: CPT

## 2021-03-09 PROCEDURE — G0378 HOSPITAL OBSERVATION PER HR: HCPCS

## 2021-03-09 PROCEDURE — 2580000003 HC RX 258: Performed by: SURGERY

## 2021-03-09 PROCEDURE — C9113 INJ PANTOPRAZOLE SODIUM, VIA: HCPCS | Performed by: SURGERY

## 2021-03-09 RX ORDER — OXYCODONE HYDROCHLORIDE AND ACETAMINOPHEN 5; 325 MG/1; MG/1
1 TABLET ORAL EVERY 6 HOURS PRN
Qty: 15 TABLET | Refills: 0 | Status: SHIPPED | OUTPATIENT
Start: 2021-03-09 | End: 2021-03-14

## 2021-03-09 RX ADMIN — OXYCODONE HYDROCHLORIDE AND ACETAMINOPHEN 2 TABLET: 5; 325 TABLET ORAL at 09:06

## 2021-03-09 RX ADMIN — Medication 10 ML: at 09:01

## 2021-03-09 RX ADMIN — OXYCODONE HYDROCHLORIDE AND ACETAMINOPHEN 1 TABLET: 5; 325 TABLET ORAL at 00:47

## 2021-03-09 RX ADMIN — PANTOPRAZOLE SODIUM 40 MG: 40 INJECTION, POWDER, FOR SOLUTION INTRAVENOUS at 09:01

## 2021-03-09 RX ADMIN — OXYCODONE HYDROCHLORIDE AND ACETAMINOPHEN 2 TABLET: 5; 325 TABLET ORAL at 05:08

## 2021-03-09 RX ADMIN — OXYCODONE HYDROCHLORIDE AND ACETAMINOPHEN 2 TABLET: 5; 325 TABLET ORAL at 12:52

## 2021-03-09 RX ADMIN — SODIUM CHLORIDE: 9 INJECTION, SOLUTION INTRAVENOUS at 00:50

## 2021-03-09 ASSESSMENT — PAIN DESCRIPTION - LOCATION
LOCATION: ABDOMEN
LOCATION: ABDOMEN
LOCATION: ABDOMEN;SHOULDER
LOCATION: ABDOMEN
LOCATION: ABDOMEN

## 2021-03-09 ASSESSMENT — PAIN SCALES - GENERAL
PAINLEVEL_OUTOF10: 6
PAINLEVEL_OUTOF10: 8
PAINLEVEL_OUTOF10: 6
PAINLEVEL_OUTOF10: 3

## 2021-03-09 ASSESSMENT — PAIN DESCRIPTION - PAIN TYPE
TYPE: SURGICAL PAIN
TYPE: SURGICAL PAIN

## 2021-03-09 ASSESSMENT — PAIN DESCRIPTION - ORIENTATION
ORIENTATION: RIGHT;UPPER
ORIENTATION: RIGHT;UPPER

## 2021-03-09 NOTE — DISCHARGE SUMMARY
[facility]    Patient Demographics    Patient. Rima Jha  Date of Birth. 1985  MRN. [id number]     Primary care provider. No primary care provider on file. (Tel: None)    Admit date: 3/8/2021    Discharge date (blank if same as Note Date): Note Date: 3/9/2021     Reason for Hospitalization. Chief Complaint   Patient presents with    Abdominal Pain     c/o mid upper abd pain, no abd surgery hx. pain comes and goes with rad up back. c/o emesis and not able to urinate. Significant Findings. Active Problems:    Acute cholecystitis       Problems and results from this hospitalization that need follow up. 1. Postop follow-up  2. Abnormal LFTs-follow up in one week    Significant test results and incidental findings. 1. CT abd/pelvis  Findings compatible with acute cholecystitis.  Ultrasound may be obtained to   evaluate for underlying cholelithiasis.       Normal appendix.       Splenomegaly. Invasive procedures and treatments. 1. None     Problem-based Hospital Course. Patient is 68-year-old male who presented to hospital with 1 day history of severe epigastric abdominal pain associate with nausea and vomiting. Emergency room CT scan the abdomen pelvis revealed acute cholecystitis. His liver enzymes were elevated however his alk phos was normal.  Patient was admitted and seen by surgery. He was taken the OR and underwent a laparoscopic cholecystectomy by Dr. Veronica Stewart on March 8. He tolerated procedure well. Intraoperative cholangiogram was normal.  The following day his liver enzymes are higher however his pain is significantly improved. He is not nauseous. He is tolerating diet. He was discharged home in stable condition. He will of a complete metabolic panel in 1 week and follow-up with Dr. Veronica Stewart. Consults.   IP CONSULT TO GENERAL SURGERY    Physical examination on discharge day. /67   Pulse 62   Temp 97.8 °F (36.6 °C) (Oral)   Resp 16   Ht 6' 1\" (1.854 m)   Wt (!) 350 lb (158.8 kg)   SpO2 96%   BMI 46.18 kg/m²   General appearance. Alert. Looks comfortable. HEENT. Sclera clear. Moist mucus membranes. Cardiovascular. Regular rate and rhythm, normal S1, S2. No murmur. Respiratory. Not using accessory muscles. Clear to auscultation bilaterally, no wheeze. Gastrointestinal. Abdomen soft, non-tender, not distended, normal bowel sounds  Neurology. Facial symmetry. No speech deficits. Moving all extremities equally. Extremities. No edema in lower extremities. Skin. Warm, dry, normal turgor    Condition at time of discharge stable    Medication instructions provided to patient at discharge. Medication List      START taking these medications    oxyCODONE-acetaminophen 5-325 MG per tablet  Commonly known as: Percocet  Take 1 tablet by mouth every 6 hours as needed for Pain for up to 5 days. Take lowest dose possible to manage pain; may stop taking sooner than 7 days if pain is able to be controlled with non-narcotic analgesics and therapies. Where to Get Your Medications      You can get these medications from any pharmacy    Bring a paper prescription for each of these medications  · oxyCODONE-acetaminophen 5-325 MG per tablet         Discharge recommendations given to patient. Follow Up. Dr Gabe Olson in 1 week, follow up CMP in one week   Disposition. home  Activity. activity as tolerated  Diet: DIET GENERAL;      Spent 35 minutes in discharge process. Signed:   Farzana Acuña PA-C     3/9/2021 11:51 AM

## 2021-03-09 NOTE — PROGRESS NOTES
[facility]        Progress Note    Patient Name: Nathalie Pablo  MRN: [id number]  [place]: 1985  Date of Evaluation: 3/9/2021    Subjective:  No acute events overnight  Pain controlled, better than pain that brought him to the ED  No nausea or vomiting, tolerating general diet  Resting in bed at this time      Post-Operative Day #1    Vital Signs:  Patient Vitals for the past 24 hrs:   BP Temp Temp src Pulse Resp SpO2   21 0845 126/67 97.8 °F (36.6 °C) Oral 62 16 --   21 0507 125/69 97.9 °F (36.6 °C) Oral 60 16 96 %   21 0411 122/69 97.6 °F (36.4 °C) Oral 55 16 97 %   21 0044 112/68 98 °F (36.7 °C) Oral 67 16 96 %   21 1955 -- -- -- -- -- 94 %   21 1930 133/77 97.9 °F (36.6 °C) Oral 60 16 98 %   21 1900 (!) 153/76 97.6 °F (36.4 °C) Oral 72 16 97 %   21 1830 (!) 156/81 97.5 °F (36.4 °C) Oral 68 16 96 %   21 1800 (!) 141/84 -- -- 83 14 99 %   21 1750 (!) 159/90 97.7 °F (36.5 °C) Temporal 85 14 96 %   21 1745 131/65 -- -- 88 14 100 %   21 1740 (!) 160/62 -- -- 81 14 100 %   21 1736 (!) 168/54 97.5 °F (36.4 °C) Temporal 81 25 100 %   21 1515 131/83 97.8 °F (36.6 °C) Oral 67 -- 96 %      TEMPERATURE HISTORY 24H: Temp (24hrs), Av.6 °F (36.4 °C), Min:97.2 °F (36.2 °C), Max:98 °F (36.7 °C)    BLOOD PRESSURE HISTORY: Systolic (52OQG), PJM:306 , Min:75 , QQI:850    Diastolic (10HUG), YLR:85, Min:39, Max:92      Intake/Output:  I/O last 3 completed shifts: In: 0751 [I.V.:4341]  Out: 50 [Blood:50]  No intake/output data recorded.   Drain/tube Output:       Physical Exam:  General: awake, alert, oriented to  person, place, time  Lungs: clear to auscultation  Abdomen: soft, non-distended, incisional tenderness only, active bowel sounds present   Skin/Wound: healing well, no drainage, no erythema, well approximated    Labs:  CBC:    Recent Labs     21  0133 21  0552   WBC 5.9 6.1 exam:->Epigastric pain Reason for Exam: Abdominal Pain Acuity: Acute Type of Exam: Ongoing FINDINGS: There is rapid clearance of radiotracer from the blood pool. There is delayed excretion of tracer in the biliary system. At 4 hours, there is no significant passage of radiotracer into the small bowel. The gallbladder is not visualized. Nonvisualization of the gallbladder is highly suggestive of cholecystitis. However, there is also significantly delayed excretion of radiotracer from the hepatocytes consistent with diffuse hepatocytes dysfunction. Correlation with liver function tests is suggested. Nonexcretion of tracer could contribute to nonvisualization of the gallbladder. Ct Abdomen Pelvis W Iv Contrast Additional Contrast? None    Result Date: 3/8/2021  EXAMINATION: CT OF THE ABDOMEN AND PELVIS WITH CONTRAST 3/8/2021 2:38 am TECHNIQUE: CT of the abdomen and pelvis was performed with the administration of intravenous contrast. Multiplanar reformatted images are provided for review. Dose modulation, iterative reconstruction, and/or weight based adjustment of the mA/kV was utilized to reduce the radiation dose to as low as reasonably achievable. COMPARISON: None. HISTORY: ORDERING SYSTEM PROVIDED HISTORY: EPIGASTRIC PAIN TECHNOLOGIST PROVIDED HISTORY: Additional Contrast?->None Reason for exam:->EPIGASTRIC PAIN Decision Support Exception->Emergency Medical Condition (MA) Reason for Exam: Abdominal Pain (c/o mid upper abd pain, no abd surgery hx. pain comes and goes with rad up back. c/o emesis and not able to urinate. ). Epigastic pain. Acuity: Acute Type of Exam: Initial FINDINGS: Lower Chest: Apparent circumferential wall thickening of distal thoracic esophagus, due to incomplete distension, chronic reflux, esophagitis, or less likely infiltrative processes. Organs: Unremarkable liver, pancreas, adrenals, and bilateral kidneys. Enlarged spleen measuring 18.3 cm in the greatest dimension. Gallbladder wall thickening/edema with surrounding fat stranding, compatible with acute cholecystitis. GI/Bowel: Normal appendix. Bowel loops nonobstructed. Distal colonic diverticula. No acute diverticulitis. Pelvis: Normal sized prostate. Grossly unremarkable urinary bladder. Tiny fat containing left inguinal hernia. Peritoneum/Retroperitoneum: No free air or free fluid. No adenopathy. Intact abdominal aorta and its major branches. Bones/Soft Tissues: Intact osseous structures. Small posterior disc bulges in the lower lumbar spine. Findings compatible with acute cholecystitis. Ultrasound may be obtained to evaluate for underlying cholelithiasis. Normal appendix. Splenomegaly. Us Abdomen Limited Specify Organ? Gallbladder, Liver    Result Date: 3/8/2021  EXAMINATION: RIGHT UPPER QUADRANT ULTRASOUND 3/8/2021 8:06 am COMPARISON: 03/08/2021 HISTORY: ORDERING SYSTEM PROVIDED HISTORY: cholecystitis, elevated lfts TECHNOLOGIST PROVIDED HISTORY: Reason for exam:->cholecystitis, elevated lfts Specify organ?->GALLBLADDER Specify organ?->LIVER FINDINGS: LIVER:  The liver is normal in size with increased echotexture consistent with hepatic steatosis. There is no ductal dilatation or mass. BILIARY SYSTEM:  The gallbladder is within normal limits without evidence of cholelithiases. No change in the gallbladder wall thickening. The common bile duct measures 3 mm. RIGHT KIDNEY: The right kidney is unremarkable measuring 10.9 cm. There is no renal mass or hydronephrosis. PANCREAS:  Visualized portions of the pancreas are unremarkable. OTHER: No evidence of right upper quadrant ascites. 1. Unchanged gallbladder wall thickening can be seen in the setting of cholecystitis. 2. Hepatic steatosis. Fl Cholangiogram Or    Result Date: 3/8/2021  EXAMINATION: SPOT IMAGES FROM AN INTRAOPERATIVE CHOLANGIOGRAM 3/8/2021 5:03 pm COMPARISON: Limited abdominal ultrasound 03/08/2021.  HISTORY: ORDERING SYSTEM PROVIDED HISTORY: pain TECHNOLOGIST PROVIDED HISTORY: Reason for exam:->pain Reason for Exam: 15 secs fluoro FLUOROSCOPY DOSE AND TYPE OR TIME AND EXPOSURES: Fluoroscopic time: 15 seconds. Number of fluoroscopic images obtained:  7. FINDINGS: Intraoperative fluoroscopy was utilized without a radiologist in attendance during intraoperative cholangiogram.  Water-soluble contrast material has been instilled into the biliary tree with opacification of extrahepatic and small portion of intrahepatic biliary tree. Caliber of visualized biliary tree is within normal limits. No intraluminal filling defects are identified to suggest presence of retained stones. There is flow of contrast material into the duodenum. No evidence of choledocholithiasis. Scheduled Meds:   pantoprazole  40 mg Intravenous Daily    sodium chloride flush  10 mL Intravenous 2 times per day     Continuous Infusions:  PRN Meds:.sodium chloride flush, promethazine **OR** ondansetron, polyethylene glycol, acetaminophen **OR** acetaminophen, hydrALAZINE, sodium chloride flush, HYDROmorphone, oxyCODONE-acetaminophen **OR** oxyCODONE-acetaminophen      Assessment:  28 y.o. male admitted with   1. Acute cholecystitis        Status-post laparoscopic cholecystectomy with intraoperative cholangiogram on 3/8/2021 for acute cholecystitis      Plan:  1. Tolerating general diet  2. Stop IV hydration  3. Activity as tolerated, ambulate TID, up to chair for all meals  4. Pulmonary toilet, incentive spirometry  5. PRN analgesics and antiemetics--minimizing narcotics as tolerated, transition to PO  6. Management of medical comorbid etiologies per primary team and consulting services  7. Disposition: Okay for discharge home from a surgical perspective; recheck labs in 1 week as an outpatient, follow up with Dr. Bandar Campbell in 2 weeks    EDUCATION:  Educated patient on plan of care and disease process--all questions answered.     Plans discussed with patient and nursing. Reviewed and discussed with Dr. Michael Gonzalez.       Signed:  GUZMAN Bergeron - CNP  3/9/2021 11:33 AM     Postop day #1 status post laparoscopic cholecystectomy  Doing well  Liver function tests slightly worse today  Okay for discharge home today  Repeat liver function tests as an outpatient  Follow-up in 2 weeks

## 2021-03-09 NOTE — PROGRESS NOTES
Shift assessment completed. Medications given per MAR. Percocet for pain. Patient independent in room and denying other needs. Call light within reach. The care plan and education has been reviewed and mutually agreed upon with the patient.

## 2021-03-09 NOTE — PROGRESS NOTES
Shift assessment completed. VSS. Patient alert and oriented x 4. Incision sites x 4 to abdomen clean, dry and well approximated. Reports pain as 6/10, prn medication given, see MAR. Tolerating diet well, denies any nausea at this time. The care plan and education have been reviewed and mutually agreed upon with the patient. Call light in reach. Will continue to monitor.

## 2021-03-09 NOTE — OP NOTE
[place] [address]                     [facility], [address] [place] [address]                                OPERATIVE REPORT    PATIENT NAME: Sheila Willis                        :        1985  MED REC NO:   [id number]                          ROOM:       8203  ACCOUNT NO:   [de-identified]                           ADMIT DATE: 2021  PROVIDER:     Mustapha Miner MD    DATE OF PROCEDURE:  2021    PREOPERATIVE DIAGNOSIS:  Acute cholecystitis. POSTOPERATIVE DIAGNOSIS:  Acute cholecystitis. PROCEDURE:  Laparoscopic cholecystectomy with intraoperative  cholangiogram.    SURGEON:  Mustapha Miner MD    ANESTHESIA:  General endotracheal and local.    ESTIMATED BLOOD LOSS:  Minimal.    COMPLICATIONS:  None. SPECIMEN:  Gallbladder. OPERATIVE INDICATIONS AND CONSENT:  The patient is a 72-year-old male  who presented to the hospital with severe upper abdominal pain. CT scan  of the abdomen and pelvis showed findings concerning for acute  cholecystitis. Right upper quadrant ultrasound did not demonstrate  gallstones. HIDA scan showed nonvisualization of the gallbladder. The  patient was brought to the operating room for laparoscopic  cholecystectomy. He was explained the risks, benefits and possible  complications including risk of bleeding, bowel injury, bile duct injury  or vascular injury to the liver. DETAILS OF THE PROCEDURE:  The patient was brought to the operative  suite and placed in a supine position on the operative table. After  general endotracheal anesthesia, he was prepped and draped in the usual  sterile fashion. We made a 5-mm transverse incision approximately 8 cm above the  umbilicus. A Veress needle was passed into the peritoneal cavity, and  after adequate insufflation, a 5-mm Optiview trocar was placed at this  site. An 11-mm subxiphoid trocar was placed followed by two 5-mm  trocars in the right upper quadrant.     The gallbladder was not initially visible. The patient had omentum  densely adherent to the undersurface of the gallbladder and to both  sides of the liver on the side of the gallbladder. The adhesions were  taken down mainly with sharp dissection. The degree of chronic  inflammation was very severe. There was omentum adherent all the way  down to the infundibular region of the gallbladder. This was carefully  dissected away. After dissection of more than 20 minutes, we were  eventually able to gain access to the hilum. The most inferior and  anteriorly lying structure was the cystic artery. This was dissected  free circumferentially, doubly clipped proximally, singly clipped  distally and divided. We continued dissection and were able to get  around the very wide cystic duct. A clip was placed at the gallbladder  cystic duct junction. A small transverse opening was then made in the  duct. A cholangiogram catheter was brought through a separate stab  incision in the right upper quadrant, placed in the opening of the duct. Intraoperative cholangiography revealed normal ductal anatomy with no  filling defects and easy flow into the duodenum. The cystic duct was  then completely transected. It was secured with a PDS Endoloop. This  gave us excellent security on the cystic duct stump. The gallbladder  was taken off of the liver bed with Bovie electrocautery, placed in an  EndoCatch bag and then removed through the subxiphoid trocar site. Trocar was then reinserted and the liver bed was inspected for bleeding  or bile leak. We did cauterize a few areas. We also placed a few  pieces of Fibrillar Surgicel on the raw surface area on the liver bed. This gave us excellent hemostasis. The trocars were removed under  direct visualization and the abdomen was de-insufflated. All the trocar sites had been previously injected with 0.5% Marcaine  with epinephrine.   The skin of the incisions was closed running 4-0  subcuticular sutures. Dermabond was then applied. The patient  tolerated the procedure without difficulty. Pamela Barboza MD    D: 03/09/2021 8:40:12       T: 03/09/2021 8:46:26     [email or web address]  Job#: [id number]     Doc#: [id number]    CC:  Vahid Bennett Pa-C

## 2021-03-09 NOTE — PROGRESS NOTES
Patient provided with discharge instructions, discussed in detail, new medications reviewed including use and side effects. Patient verbalized understanding. All questions answered, family at the bedside to transport home. Patient discharged home with prescription, paperwork and all personal belongings.

## 2021-03-09 NOTE — PLAN OF CARE
Problem: Pain:  Goal: Pain level will decrease  Description: Pain level will decrease  3/9/2021 0917 by Irene Nelson RN  Outcome: Ongoing  3/8/2021 1954 by Emilia Amaro RN  Outcome: Ongoing  Goal: Control of acute pain  Description: Control of acute pain  3/9/2021 0917 by Irene Nelson RN  Outcome: Ongoing  3/8/2021 1954 by Emilia Amaro RN  Outcome: Ongoing

## 2021-03-09 NOTE — PLAN OF CARE
Problem: Pain:  Goal: Pain level will decrease  Description: Pain level will decrease  3/8/2021 1954 by aJnnie Morales RN  Outcome: Ongoing  3/8/2021 1118 by Anita Ayala RN  Outcome: Ongoing  3/8/2021 0819 by Hannah Ruiz RN  Outcome: Ongoing  Goal: Control of acute pain  Description: Control of acute pain  3/8/2021 1954 by Jannie Morales RN  Outcome: Ongoing  3/8/2021 1118 by Anita Ayala RN  Outcome: Ongoing  3/8/2021 0819 by Hannah Ruiz RN  Outcome: Ongoing

## 2021-03-19 ENCOUNTER — HOSPITAL ENCOUNTER (OUTPATIENT)
Age: 36
Discharge: HOME | End: 2021-03-19

## 2021-03-19 DIAGNOSIS — K81.0 ACUTE CHOLECYSTITIS: ICD-10-CM

## 2021-03-19 LAB
BASOPHILS ABSOLUTE: 0 K/UL (ref 0–0.2)
BASOPHILS RELATIVE PERCENT: 0.5 %
BILIRUB SERPL-MCNC: 0.5 MG/DL (ref 0–1)
BILIRUBIN DIRECT: <0.2 MG/DL (ref 0–0.3)
BILIRUBIN, INDIRECT: NORMAL MG/DL (ref 0–1)
EOSINOPHILS ABSOLUTE: 0.1 K/UL (ref 0–0.6)
EOSINOPHILS RELATIVE PERCENT: 2.6 %
HCT VFR BLD CALC: 43.2 % (ref 40.5–52.5)
HEMOGLOBIN: 15.4 G/DL (ref 13.5–17.5)
LYMPHOCYTES ABSOLUTE: 2 K/UL (ref 1–5.1)
LYMPHOCYTES RELATIVE PERCENT: 41.1 %
MCH RBC QN AUTO: 30.8 PG (ref 26–34)
MCHC RBC AUTO-ENTMCNC: 35.6 G/DL (ref 31–36)
MCV RBC AUTO: 86.4 FL (ref 80–100)
MONOCYTES ABSOLUTE: 0.3 K/UL (ref 0–1.3)
MONOCYTES RELATIVE PERCENT: 6.9 %
NEUTROPHILS ABSOLUTE: 2.3 K/UL (ref 1.7–7.7)
NEUTROPHILS RELATIVE PERCENT: 48.9 %
PDW BLD-RTO: 12.6 % (ref 12.4–15.4)
PLATELET # BLD: 131 K/UL (ref 135–450)
PMV BLD AUTO: 11.9 FL (ref 5–10.5)
RBC # BLD: 4.99 M/UL (ref 4.2–5.9)
WBC # BLD: 4.8 K/UL (ref 4–11)

## 2021-03-19 PROCEDURE — 85025 COMPLETE CBC W/AUTO DIFF WBC: CPT

## 2021-03-19 PROCEDURE — 82248 BILIRUBIN DIRECT: CPT

## 2021-03-19 PROCEDURE — 36415 COLL VENOUS BLD VENIPUNCTURE: CPT

## 2021-03-19 PROCEDURE — 80053 COMPREHEN METABOLIC PANEL: CPT

## 2021-03-20 LAB
A/G RATIO: 1.3 (ref 1.1–2.2)
ALBUMIN SERPL-MCNC: 4.3 G/DL (ref 3.4–5)
ALP BLD-CCNC: 87 U/L (ref 40–129)
ALT SERPL-CCNC: 52 U/L (ref 10–40)
ANION GAP SERPL CALCULATED.3IONS-SCNC: 11 MMOL/L (ref 3–16)
AST SERPL-CCNC: 36 U/L (ref 15–37)
BUN BLDV-MCNC: 19 MG/DL (ref 7–20)
CALCIUM SERPL-MCNC: 10.1 MG/DL (ref 8.3–10.6)
CHLORIDE BLD-SCNC: 101 MMOL/L (ref 99–110)
CO2: 25 MMOL/L (ref 21–32)
CREAT SERPL-MCNC: 1.1 MG/DL (ref 0.9–1.3)
GFR AFRICAN AMERICAN: >60
GFR NON-AFRICAN AMERICAN: >60
GLOBULIN: 3.2 G/DL
GLUCOSE BLD-MCNC: 103 MG/DL (ref 70–99)
POTASSIUM SERPL-SCNC: 4.2 MMOL/L (ref 3.5–5.1)
SODIUM BLD-SCNC: 137 MMOL/L (ref 136–145)
TOTAL PROTEIN: 7.5 G/DL (ref 6.4–8.2)

## 2021-03-25 ENCOUNTER — OFFICE VISIT (OUTPATIENT)
Dept: SURGERY | Age: 36
End: 2021-03-25

## 2021-03-25 VITALS
BODY MASS INDEX: 48.16 KG/M2 | DIASTOLIC BLOOD PRESSURE: 60 MMHG | WEIGHT: 315 LBS | SYSTOLIC BLOOD PRESSURE: 120 MMHG | TEMPERATURE: 97.3 F

## 2021-03-25 DIAGNOSIS — K81.0 ACUTE CHOLECYSTITIS: Primary | ICD-10-CM

## 2021-03-25 PROCEDURE — 99024 POSTOP FOLLOW-UP VISIT: CPT | Performed by: SURGERY

## (undated) DEVICE — APPLIER CLP M/L SHFT DIA5MM 15 LIG LIGAMAX 5

## (undated) DEVICE — TROCAR: Brand: KII FIOS FIRST ENTRY

## (undated) DEVICE — SUTURE VCRL SZ 0 L27IN ABSRB VLT L22MM CT-3 1/2 CIR J329H

## (undated) DEVICE — STERILE POLYISOPRENE POWDER-FREE SURGICAL GLOVES: Brand: PROTEXIS

## (undated) DEVICE — MERCY FAIRFIELD TURNOVER KIT: Brand: MEDLINE INDUSTRIES, INC.

## (undated) DEVICE — CHLORAPREP 26ML ORANGE

## (undated) DEVICE — NEEDLE HYPO 22GA L1.5IN BLK POLYPR HUB S STL REG BVL STR

## (undated) DEVICE — ADHESIVE SKIN CLSR 0.7ML TOP DERMBND ADV

## (undated) DEVICE — COVER LT HNDL BLU PLAS

## (undated) DEVICE — LAPAROSCOPIC SCISSORS: Brand: EPIX LAPAROSCOPIC SCISSORS

## (undated) DEVICE — CATHETER CHOLANGIOGRAM 4.5 FRX3 IN W/ 20018M55 TAUT INTRO

## (undated) DEVICE — TISSUE RETRIEVAL SYSTEM: Brand: INZII RETRIEVAL SYSTEM

## (undated) DEVICE — Device

## (undated) DEVICE — CORD ES L10FT MPLR LAP

## (undated) DEVICE — SUTURE VCRL SZ 4-0 L18IN ABSRB UD L19MM PS-2 3/8 CIR PRIM J496H

## (undated) DEVICE — SPONGE,LAP,4"X18",XR,ST,5/PK,40PK/CS: Brand: MEDLINE INDUSTRIES, INC.

## (undated) DEVICE — SYRINGE, LUER LOCK, 10ML: Brand: MEDLINE

## (undated) DEVICE — TROCAR: Brand: KII® SLEEVE

## (undated) DEVICE — PLUMEPORT LAPAROSCOPIC SMOKE FILTRATION DEVICE: Brand: PLUMEPORT ACTIV

## (undated) DEVICE — NEEDLE INSUF L120MM DIA2MM DISP FOR PNEUMOPERI ENDOPATH

## (undated) DEVICE — LAPAROSCOPY PACK: Brand: MEDLINE INDUSTRIES, INC.

## (undated) DEVICE — SET EXTN PRIMING 4.9ML L30IN INCL SLDE CLMP SPIN M LUERLOCK

## (undated) DEVICE — CATHETER CHOLGM 4.5FR L18IN W/ MTL SUPP TB

## (undated) DEVICE — DRAPE C ARM UNIV W41XL74IN CLR PLAS XR VELC CLSR POLY STRP

## (undated) DEVICE — SOLUTION IV IRRIG POUR BRL 0.9% SODIUM CHL 2F7124

## (undated) DEVICE — DRAPE,LAP,CHOLE,W/TROUGHS,STERILE: Brand: MEDLINE

## (undated) DEVICE — SYRINGE MED 30ML STD CLR PLAS LUERLOCK TIP N CTRL DISP

## (undated) DEVICE — LOOP LIG SUT SZ 0 L18IN ABSRB POLYDIOXANONE MFIL PDS II